# Patient Record
Sex: FEMALE | Race: WHITE
[De-identification: names, ages, dates, MRNs, and addresses within clinical notes are randomized per-mention and may not be internally consistent; named-entity substitution may affect disease eponyms.]

---

## 2019-04-04 ENCOUNTER — HOSPITAL ENCOUNTER (INPATIENT)
Dept: HOSPITAL 92 - ERS | Age: 84
Discharge: HOME | DRG: 690 | End: 2019-04-04
Attending: INTERNAL MEDICINE | Admitting: INTERNAL MEDICINE
Payer: MEDICARE

## 2019-04-04 VITALS — SYSTOLIC BLOOD PRESSURE: 138 MMHG | DIASTOLIC BLOOD PRESSURE: 84 MMHG | TEMPERATURE: 98.3 F

## 2019-04-04 VITALS — BODY MASS INDEX: 24.7 KG/M2

## 2019-04-04 DIAGNOSIS — E86.0: ICD-10-CM

## 2019-04-04 DIAGNOSIS — I48.0: ICD-10-CM

## 2019-04-04 DIAGNOSIS — I10: ICD-10-CM

## 2019-04-04 DIAGNOSIS — E78.5: ICD-10-CM

## 2019-04-04 DIAGNOSIS — N17.9: ICD-10-CM

## 2019-04-04 DIAGNOSIS — G93.49: ICD-10-CM

## 2019-04-04 DIAGNOSIS — Z79.01: ICD-10-CM

## 2019-04-04 DIAGNOSIS — N39.0: Primary | ICD-10-CM

## 2019-04-04 LAB
ALBUMIN SERPL BCG-MCNC: 4.4 G/DL (ref 3.4–4.8)
ALP SERPL-CCNC: 49 U/L (ref 40–150)
ALT SERPL W P-5'-P-CCNC: 16 U/L (ref 8–55)
ANION GAP SERPL CALC-SCNC: 12 MMOL/L (ref 10–20)
AST SERPL-CCNC: 19 U/L (ref 5–34)
BACTERIA UR QL AUTO: (no result) HPF
BASOPHILS # BLD AUTO: 0 THOU/UL (ref 0–0.2)
BASOPHILS NFR BLD AUTO: 0.5 % (ref 0–1)
BILIRUB SERPL-MCNC: 1 MG/DL (ref 0.2–1.2)
BUN SERPL-MCNC: 21 MG/DL (ref 9.8–20.1)
CALCIUM SERPL-MCNC: 9.8 MG/DL (ref 7.8–10.44)
CHLORIDE SERPL-SCNC: 105 MMOL/L (ref 98–107)
CO2 SERPL-SCNC: 27 MMOL/L (ref 23–31)
CREAT CL PREDICTED SERPL C-G-VRATE: 0 ML/MIN (ref 70–130)
CRYSTAL-AUWI FLAG: 0 (ref 0–15)
EOSINOPHIL # BLD AUTO: 0 THOU/UL (ref 0–0.7)
EOSINOPHIL NFR BLD AUTO: 0.7 % (ref 0–10)
GLOBULIN SER CALC-MCNC: 2.4 G/DL (ref 2.4–3.5)
GLUCOSE SERPL-MCNC: 116 MG/DL (ref 83–110)
HEV IGM SER QL: 20.4 (ref 0–7.99)
HGB BLD-MCNC: 14.6 G/DL (ref 12–16)
INR PPP: 1.5
LYMPHOCYTES # BLD: 0.9 THOU/UL (ref 1.2–3.4)
LYMPHOCYTES NFR BLD AUTO: 17.2 % (ref 21–51)
MCH RBC QN AUTO: 31.6 PG (ref 27–31)
MCV RBC AUTO: 94.7 FL (ref 78–98)
MONOCYTES # BLD AUTO: 0.6 THOU/UL (ref 0.11–0.59)
MONOCYTES NFR BLD AUTO: 10.2 % (ref 0–10)
NEUTROPHILS # BLD AUTO: 3.9 THOU/UL (ref 1.4–6.5)
NEUTROPHILS NFR BLD AUTO: 71.3 % (ref 42–75)
PATHC CAST-AUWI FLAG: 1.9 (ref 0–2.49)
PLATELET # BLD AUTO: 197 THOU/UL (ref 130–400)
POTASSIUM SERPL-SCNC: 4.1 MMOL/L (ref 3.5–5.1)
PROT UR STRIP.AUTO-MCNC: 100 MG/DL
PROTHROMBIN TIME: 17.7 SEC (ref 12–14.7)
RBC # BLD AUTO: 4.63 MILL/UL (ref 4.2–5.4)
RBC UR QL AUTO: (no result) HPF (ref 0–3)
SODIUM SERPL-SCNC: 140 MMOL/L (ref 136–145)
SP GR UR STRIP: 1.02 (ref 1–1.04)
SPERM-AUWI FLAG: 0 (ref 0–9.9)
WBC # BLD AUTO: 5.4 THOU/UL (ref 4.8–10.8)
YEAST-AUWI FLAG: 0 (ref 0–25)

## 2019-04-04 PROCEDURE — 82550 ASSAY OF CK (CPK): CPT

## 2019-04-04 PROCEDURE — 85025 COMPLETE CBC W/AUTO DIFF WBC: CPT

## 2019-04-04 PROCEDURE — 84484 ASSAY OF TROPONIN QUANT: CPT

## 2019-04-04 PROCEDURE — 96365 THER/PROPH/DIAG IV INF INIT: CPT

## 2019-04-04 PROCEDURE — 36415 COLL VENOUS BLD VENIPUNCTURE: CPT

## 2019-04-04 PROCEDURE — 70450 CT HEAD/BRAIN W/O DYE: CPT

## 2019-04-04 PROCEDURE — 80053 COMPREHEN METABOLIC PANEL: CPT

## 2019-04-04 PROCEDURE — 87086 URINE CULTURE/COLONY COUNT: CPT

## 2019-04-04 PROCEDURE — 93005 ELECTROCARDIOGRAM TRACING: CPT

## 2019-04-04 PROCEDURE — 81015 MICROSCOPIC EXAM OF URINE: CPT

## 2019-04-04 PROCEDURE — 81003 URINALYSIS AUTO W/O SCOPE: CPT

## 2019-04-04 PROCEDURE — 71045 X-RAY EXAM CHEST 1 VIEW: CPT

## 2019-04-04 PROCEDURE — 85610 PROTHROMBIN TIME: CPT

## 2019-04-04 NOTE — HP
PRIMARY CARE PHYSICIAN:  [facility] Call admission.



REASON FOR ADMISSION:  Altered mental status, urinary tract infection.



HISTORY OF PRESENT ILLNESS:  This is an 86-year-old female who has underlying

history of paroxysmal atrial fibrillation and dyslipidemia, who presented to

emergency room for evaluation of altered mental status.  The patient came earlier to

emergency room with her sister.  At that time, the patient was found completely

normal.  The patient was visiting twin sister who is currently admitted in Stroke

Unit.  Subsequently, the patient was also found with altered mental status.  She was

disoriented.  She was having some vague urinary tract infection symptoms.  The

patient was taken to the ER and subsequently, the patient had CT of brain, which was

unremarkable.  Her chest x-ray was unremarkable.  Routine blood test was also

unremarkable.  The patient's urinalysis showed significant urinary tract infection.

The patient was given IV fluid and the patient was given Rocephin.  The patient also

clinically appeared dehydrated.  The patient was not eating lately enough and

drinking enough as well. 



REVIEW OF SYSTEMS:  CONSTITUTIONAL:  Negative for weight loss or gain, ability to

conduct usual activities. 

SKIN:  Negative for rash, itching. 

EYES:  Negative for double vision, pain. 

ENT/MOUTH:  Negative for nose bleeding, neck stiffness, pain, tenderness. 

CARDIOVASCULAR:  Negative for palpitations, dyspnea on exertion, orthopnea. 

RESPIRATORY:  Negative for shortness of breath, wheezing, cough, hemoptysis, fever

or night sweats. 

GASTROINTESTINAL:  Negative for poor appetite, abdominal pain, heartburn, nausea,

vomiting, constipation, or diarrhea. 

GENITOURINARY:  Negative for urgency, frequency, dysuria, nocturia. 

MUSCULOSKELETAL:  Negative for pain, swelling. 

NEUROLOGIC/PSYCHIATRIC:  Negative for anxiety, depression. 

ALLERGY/IMMUNOLOGIC:  Negative for skin rash, bleeding tendency. 



Please see my HPI for pertinent positives and negatives.  All other review of

systems reviewed and negative except as mentioned in HPI. 



PAST MEDICAL HISTORY:  Paroxysmal atrial fibrillation, dyslipidemia, and

hypertension. 



PAST SURGICAL HISTORY:  Reviewed and negative.



PAST PSYCHIATRIC HISTORY:  Reviewed and negative.



SOCIAL HISTORY:  The patient lives with her sister.  No history of tobacco, alcohol,

or illicit drug abuse. 



FAMILY HISTORY:  No family history of coronary artery disease, stroke, or cancer.



ALLERGIES:  NO KNOWN DRUG ALLERGIES.



CURRENT HOME MEDICATION:  

1. Toprol-XL 25 mg p.o. daily.

2. Zocor 20 mg p.o. daily. 

3. Warfarin 10 mg daily.



EMERGENCY ROOM COURSE:  The patient has received Rocephin.



PHYSICAL EXAMINATION:

VITAL SIGNS:  Currently, blood pressure 151/99, pulse 109, respiratory rate 18,

temperature 98.9, and saturation 94% on room air.  Weight is 74.8 kg. 

GENERAL:  The patient is currently alert and awake, no obvious acute distress. 

HEENT:  Head; normocephalic, atraumatic.  Eyes; pupils round and reactive to light.

Extraocular muscle intact.  ENT; oropharynx within normal limits.  Moist mucous

membranes.  No oral lesion.  No pharyngeal erythema.  No exudate. 

NECK:  Supple.  No JVD.  No thyromegaly.  No carotid bruit.  No jugular venous

distention. 

LUNGS:  Clear to auscultation without any rhonchi or rales. 

CARDIAC:  S1, S2 appears irregular.  No murmur elicited.  No gallop.  No rub. 

ABDOMEN:  Soft.  Bowel sounds present.  Nontender.  Nondistended.  No organomegaly.

No mass.  No suprapubic tenderness. 

BACK:  Unremarkable.  No CVA tenderness. 

EXTREMITIES:  Upper extremities, passive movement of all joints are normal.  Lower

extremities, no edema.  Good distal pulsation. 

SKIN:  No skin rash. 

HEMATOLOGICAL:  No lymphadenopathy. 

NEUROLOGIC:  Nonfocal examination.  The patient slightly appears confused.



SIGNIFICANT LABORATORY DATA:  EKG showing atrial fibrillation with controlled

ventricular response, incomplete right bundle-branch block pattern, LVH.  Chest

x-ray based on my review, no acute cardiopulmonary process.  CT brain based on my

review, no acute intracranial process. 



CBC; WBC 5.4, hemoglobin 14.6, platelet 197.  BMP; sodium 140, potassium 4.1,

chloride 105, carbon dioxide 27, BUN 21, creatinine 1.20, glucose 116, calcium 9.8.

LFTs; AST 19, ALT 16, alkaline phosphatase 49, albumin 4.4.  Troponin less than

0.010.  .  Urinalysis suggestive of UTI. 



ASSESSMENT AND PLAN:  

1. Acute encephalopathy, likely due to urinary tract infection.

2. Urinary tract infection.

3. Mild dehydration with acute kidney injury.

4. Atrial fibrillation with controlled ventricular response.

5. Chronic anticoagulation.

6. Hypertension.

7. Dyslipidemia.



PLAN:  Admission to medical floor.  We will resume the patient's home medication.

Check PT/INR and resume warfarin as per home dosage.  Restart Zocor 20 mg p.o.

daily.  Continue Rocephin 2 g IV q.12h. continue IV fluid NS at 100 mL/h.  Repeat

labs tomorrow.  Follow up on culture result. 



DVT prophylaxis, the patient is already on warfarin therapy and that is why we will

hold on Lovenox therapy, but we will check PT/INR and then resume the patient's home

dose of warfarin therapy.  GI prophylaxis, Pepcid 20 mg p.o. b.i.d. 



CODE STATUS:  The patient is full code.



DISPOSITION PLAN:  Based on clinical course.





Job ID:  [id number]

## 2019-04-04 NOTE — RAD
FExam: Chest one view



HISTORY:Altered mental state



Comparison: None



FINDINGS:

Cardiac silhouette:Enlarged

Pulmonary vessels: Normal

Costophrenic angles: Blunting of the left costophrenic angle. Minimal blunting of the right costophre
immanuel angle.



LUNGS: Hyperinflation. Linear opacities in the lung bases are presumed to be due to chronic change.



Pleural-based calcifications in the left hemithorax are suspected. Correlate for previous asbestos ex
posure.

Pneumothorax: None



Osseous abnormalities: None



IMPRESSION: 

1. Chronic changes lung bases.

2. Possible pleural-based calcifications in the left hemithorax. Correlate for asbestos exposure.



Reported By: Claudia Land 

Electronically Signed:  4/4/2019 7:48 AM

## 2019-04-04 NOTE — CT
CT HEAD NONCONTRAST:

 

Date:  04/04/19 

 

INDICATION:

Altered mental status. 

 

No prior comparison. 

 

FINDINGS:

There is age-related parenchymal volume loss. Ventricular system is age-appropriate in size. No acute
 intracranial hemorrhage, mass effect, or midline shift. There is mild mucosal thickening within the 
paranasal sinuses. Mild chronic ischemic disease is present. 

 

IMPRESSION: 

No acute intracranial hemorrhage or mass effect. 

 

 

 

POS: WANDY

## 2019-04-05 NOTE — DIS
DATE OF ADMISSION:  04/04/2019



DATE OF DISCHARGE:  04/04/2019



PRIMARY CARE PHYSICIAN:  [facility]



DISCHARGE DISPOSITION:  Home.



PRIMARY DISCHARGE DIAGNOSES:  

1. Acute encephalopathy, improved.

2. Urinary tract infection.

3. Dehydration, corrected.

4. Acute kidney injury due to dehydration.



SECONDARY DISCHARGE DIAGNOSES:  

1. Paroxysmal atrial fibrillation.

2. Dyslipidemia.

3. Chronic anticoagulation.



PRIMARY PROCEDURE/OPERATION:  None.



RADIOLOGICAL INVESTIGATION:  CT brain, normal.  Chest x-ray, normal.



SIGNIFICANT LABORATORY DATA:  CBC normal.  INR 1.5.  BMP unremarkable other than

creatinine 1.20.  LFT normal.  Urinalysis suggestive of UTI.  Urine culture

negative. 



DISCHARGE MEDICATION:  

1. Macrobid 100 mg b.i.d. for 7 days.

2. Warfarin 10 mg daily.

3. Zocor 20 mg p.o. at bedtime.

4. Toprol-XL 25 mg daily.

5. Digoxin 0.25 mg p.o. daily.



CONTRAINDICATION:  None.



CODE STATUS:  Full code.



INPATIENT CONSULT:  None.



ALLERGIES:  NO KNOWN DRUG ALLERGIES.



DISCHARGE PLAN:  Posthospital, the patient will follow up with primary care

physician. 



HOSPITAL COURSE:  This patient was admitted by me on April 4, 2019.  Please see my

HPI for further details.  This patient was also discharged on April 4, 2019.  This

is late dictation for discharge summary, but the patient was admitted and discharged

on the same day.  She had mild transient alteration in mental status, which was

attributed to be due to UTI and dehydration.  She was given IV fluid, antibiotic

therapy, and next few hours the patient's condition significantly improved and

dramatically improved and the patient wanted to go home. 



We changed to Macrobid upon discharge and new medication sent to her pharmacy. 



Her physical examination is unremarkable.  Overall, the patient is medically stable

for discharge. 



The patient was admitted and discharged on the same day.







Job ID:  [id number]